# Patient Record
Sex: FEMALE | Race: WHITE | Employment: FULL TIME | URBAN - METROPOLITAN AREA
[De-identification: names, ages, dates, MRNs, and addresses within clinical notes are randomized per-mention and may not be internally consistent; named-entity substitution may affect disease eponyms.]

---

## 2021-12-02 ENCOUNTER — OFFICE VISIT (OUTPATIENT)
Dept: OPTOMETRY | Facility: CLINIC | Age: 36
End: 2021-12-02
Payer: COMMERCIAL

## 2021-12-02 DIAGNOSIS — Z01.00 EXAMINATION OF EYES AND VISION: Primary | ICD-10-CM

## 2021-12-02 DIAGNOSIS — H52.03 HYPEROPIA, BILATERAL: ICD-10-CM

## 2021-12-02 DIAGNOSIS — H02.209 LAGOPHTHALMOS, UNSPECIFIED LATERALITY: ICD-10-CM

## 2021-12-02 PROCEDURE — 92004 COMPRE OPH EXAM NEW PT 1/>: CPT | Performed by: OPTOMETRIST

## 2021-12-02 ASSESSMENT — VISUAL ACUITY
OS_SC: 20/20
OD_SC+: -2
OD_SC: 20/20
OS_SC: 20/20
METHOD: SNELLEN - LINEAR
OD_SC: 20/20

## 2021-12-02 ASSESSMENT — REFRACTION_MANIFEST
OS_SPHERE: +0.50
OD_SPHERE: +0.50

## 2021-12-02 ASSESSMENT — SLIT LAMP EXAM - LIDS
COMMENTS: LAGOPHTHALMOS
COMMENTS: LAGOPHTHALMOS

## 2021-12-02 ASSESSMENT — CONF VISUAL FIELD
OS_NORMAL: 1
OD_NORMAL: 1

## 2021-12-02 ASSESSMENT — TONOMETRY
OS_IOP_MMHG: 13
IOP_METHOD: TONOPEN
OD_IOP_MMHG: 10

## 2021-12-02 ASSESSMENT — CUP TO DISC RATIO
OS_RATIO: 0.25
OD_RATIO: 0.25

## 2021-12-02 ASSESSMENT — EXTERNAL EXAM - RIGHT EYE: OD_EXAM: NORMAL

## 2021-12-02 ASSESSMENT — EXTERNAL EXAM - LEFT EYE: OS_EXAM: NORMAL

## 2021-12-02 NOTE — PATIENT INSTRUCTIONS
Optional glasses as needed for computer with blue blocking tint.    Night time ointment before bed.  Tranquileyes goggles for sleeping.  These can be purchased at [employer] or cut a round section of Glad Press N Seal to press eyelid shut at night.    Refresh tears or other artificial tear 1 drop 2-4x daily.    OTC Lumify as needed to take the redness out.    Return in 1 year for a complete eye exam or sooner if needed.    Foreign Hurt, OD    The affects of the dilating drops last for 4- 6 hours.  You will be more sensitive to light and vision will be blurry up close.  Do not drive if you do not feel comfortable.  Mydriatic sunglasses were given if needed.    There is a combination of three treatments which can greatly improve symptoms of dry eyes.    1.  Artificial tears  2. Heat (eyes closed)  3. Eyelid and eyelash cleansing (eyes closed)     Use one drop of artificial tears both eyes 4 x daily.  Once in the morning, lunch, dinner and bedtime. Continue to use the drops regardless if your eyes are comfortable or not.  Artificial tears work best as a preventative and not as well after your eyes are starting to bother you.  It may take 4- 6 weeks of using the drops before you notice improvement.  If after that time you are still having problems schedule an appointment for an evaluation and discussion of different treatments such as Restasis or Xiidra.  Dry eyes are a chronic condition and you may have more symptoms at certain times of the year.    Excess tearing can be due to the right tears not working properly or a blockage in the tear drainage system.  You can try using artificial tears 1 drop both eyes 4 x day.  If the excess tearing is bothersome after 4-6 weeks of treatment then we can send you for further testing.  This would entail a referral to our oculoplastic specialist Dr. Sultana Garrison at the [facility]-[phone].    Recommended brands are:    Systane Complete  Systane Ultra  Systane  Balance  Refresh Advanced Optive  Refresh Relieva  Blink    Recommended brands for contact lens wearers are:    Systane contacts  Refresh contacts  Blink contacts    If you are using drops more than 4 x day or have sensitivities to preservatives I recommend non preserved artificial tears.  These come in 1 use vials.  They can be used every 1-2 hours.  Do not reuse the vials.    Recommended brands are:    Refresh Optive Willie-3  Systane- preservative free  Refresh-  preservative free  Blink- preservative free    Gels or ointment can be used at night.    Recommended brands are:    Systane Gel  Refresh Gel  Blink Gel  Genteal Gel    Systane night time (ointment)  Refresh Celluvisc  Refresh PM (ointment)      Visine, Clear Eyes or Murine (drops that get the red out) can irritate the eyes and cause a rebound effect where the eyes become more red and you end up using more drops.  Avoid drops containing tetrahydrozoline, naphazoline, phenylephrine, oxymetazoline.      OTC Lumify is a newer product that gives immediate redness relief without the rebound effect.  Use as needed to take the redness out.    Artificial tears may be used with other drops (such as allergy, glaucoma, antibiotics) around the same time.  Be sure to wait 5 minutes in between drops.    Heat to the eyelids can also improve your symptoms of dry eyes.  Vilma heat masks can be purchased at [employer] to be used nightly for 10-15 minutes.  Other options are gel masks that can be put in the microwave and purchased at most pharmacies.      Tea Tree Oil eyelid cleansers recommended are Ocusoft Oust foam cleanser to cleanse eyelids/lashes at night and in the am. Other options are Blephadex or Cliradex eyelid wipes.  KEEP EYES CLOSED when using these products.  These can be purchased on amazon.com   A good product for make up remover with tea tree oil is WeLoveEyes.  This can be found at www.[employer] or [employer].    Other good eyelid cleansers have  hypochlorous acid which removes excess bacteria and is safe around the eyes. Products are Avenova, Ocusoft Hypochlor or Heyedrate. Spray solution onto cotton pad, close eyes and gently apply to eyelids and eyelashes using side to side motion.  You can also KEEP EYES CLOSED spray and rub into eyelashes.  You do not need to rinse it off. Use morning and evening. These products can be found on [employer].  You can check with your local pharmacy and see if they can order if for you if they don't have it.    Other brands of eyelid cleansing wipes are:    Ocusoft wipes  Systane wipes    A great eye make up line is [email or web address]      Optometry Providers       Clinic Locations                                 Telephone Number   Dr. Genesis Haines [phone]     [facility] Hours:                [facility] Hours:       [facility] Hours:   [address]   [address] [address]      [address] [facility]  [place] [address]   [place] [address]    [place] [address]  Phone: [phone]                    Phone: [phone]     Phone: [phone]                      Monday 8:00-7:00                          Monday 8:00-7:00                          Monday 8:00-7:00              Tuesday 8:00-6:00                          Tuesday 8:00-7:00                          Tuesday 8:00-7:00              Wednesday 8:00-6:00                  Wednesday 8:00-7:00                   Wednesday 8:00-7:00      Thursday 8:00-6:00                        Thursday 8:00-7:00                         Thursday 8:00-7:00            Friday 8:00-5:00                              Friday 8:00-5:00                              Friday 8:00-5:00    [facility] Hours:   [address] [facility] [place] [id number] [phone]    Monday 8:00-7:00  Tuesday 8:00-7:00  Wednesday 8:00-7:00  Thursday 8:00-7:00  Friday  8:00-5:00  Please log on to [email or web address] to order your contact lenses.  The link is found on the [facility] page.  As always, Thank you for trusting us with your health care needs!

## 2021-12-02 NOTE — PROGRESS NOTES
Chief Complaint   Patient presents with     COMPREHENSIVE EYE EXAM         Last Eye Exam:  2016 or 2017  Dilated Previously: No, side effects of dilation explained today    What are you currently using to see?  does not use glasses or contacts       Distance Vision Acuity: Satisfied with vision    Near Vision Acuity: Satisfied with vision while reading  unaided    Eye Comfort: dry  Do you use eye drops? : Yes: otc tears randomly  Occupation or Hobbies: [employer] - on the laptop all day    CECILE Millard         Medical, surgical and family histories reviewed and updated 12/2/2021.       OBJECTIVE: See Ophthalmology exam    ASSESSMENT:    ICD-10-CM    1. Examination of eyes and vision  Z01.00    2. Hyperopia, bilateral  H52.03    3. 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    Night time ointment before bed.  Tranquileyes goggles for sleeping.  These can be purchased at [employer] or cut a round section of Glad Press N Seal to press eyelid shut at night.    Refresh tears or other artificial tear 1 drop 2-4x daily.    OTC Lumify as needed to take the redness out.    Return in 1 year for a complete eye exam or sooner if needed.    Foreign Hurt, OD

## 2021-12-02 NOTE — LETTER
12/2/2021         RE: Carmel Orellana  [address]  [facility] [address]        Dear Colleague,    Thank you for referring your patient, Carmel Orellana, to the [facility]. Please see a copy of my visit note below.    Chief Complaint   Patient presents with     COMPREHENSIVE EYE EXAM         Last Eye Exam:  2016 or 2017  Dilated Previously: No, side effects of dilation explained today    What are you currently using to see?  does not use glasses or contacts       Distance Vision Acuity: Satisfied with vision    Near Vision Acuity: Satisfied with vision while reading  unaided    Eye Comfort: dry  Do you use eye drops? : Yes: otc tears randomly  Occupation or Hobbies: [employer] - on the laptop all day    CECILE Millard         Medical, surgical and family histories reviewed and updated 12/2/2021.       OBJECTIVE: See Ophthalmology exam    ASSESSMENT:    ICD-10-CM    1. Examination of eyes and vision  Z01.00    2. Hyperopia, bilateral  H52.03    3. 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    Night time ointment before bed.  Tranquileyes goggles for sleeping.  These can be purchased at [employer] or cut a round section of Glad Press N Seal to press eyelid shut at night.    Refresh tears or other artificial tear 1 drop 2-4x daily.    OTC Lumify as needed to take the redness out.    Return in 1 year for a complete eye exam or sooner if needed.    Foreign Hurt, DELIA               Again, thank you for allowing me to participate in the care of your patient.        Sincerely,        Foreign Hurt, OD